# Patient Record
(demographics unavailable — no encounter records)

---

## 2024-12-11 NOTE — PHYSICAL EXAM
[Left] : left shoulder Notification Instructions: Patient will be notified of biopsy results. However, patient instructed to call the office if not contacted within 2 weeks. [5 ___] : forward flexion 5[unfilled]/5 [5___] : internal rotation 5[unfilled]/5 [] : positive [place] [[email or web address]] : active forward flexion 170 degrees [de-identified] : active abduction 160 degrees [de-identified] : external rotation at 90 degrees of abduction 90 degrees [[email or web address]] : internal rotation at 90 degrees of abduction 90 degrees

## 2024-12-11 NOTE — HISTORY OF PRESENT ILLNESS
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 Gradual onset of pain with kickboxing classes. Pain is anterior and associated with weakness. Worse with lifting objects, pushing motions, better with rest, Tylenol, NSAIDs.  H/O L shoulder partial supraspinatus tear, possible labrum tear 2014, conservative treatment with CSIs, prolotherapy, PRP injections (2-3) with Dr. Jeannie Andrade  PMH Graves disease, hypothyroidism, depression/anxiety/ADHD

## 2024-12-11 NOTE — DISCUSSION/SUMMARY
[de-identified] : 45f with left shoulder pain. Hx of labral tearing and partial tearing of the supraspinatus tendon in the past.  1) updated MRI left shoulder, eval labral  / rotator cuff tear 2) cryotherapy, rest and activity and exercise modification  3) note to freeze gym membership 4) rtc after MRI   Entered by Cindy White acting as scribe. Dr. Blair- The documentation recorded by the scribe accurately reflects the service I personally performed and the decisions made by me.

## 2024-12-18 NOTE — DATA REVIEWED
[MRI] : MRI [Left] : left [Shoulder] : shoulder [Report was reviewed and noted in the chart] : The report was reviewed and noted in the chart [I independently reviewed and interpreted images and report] : I independently reviewed and interpreted images and report [I reviewed the films/CD] : I reviewed the films/CD [FreeTextEntry1] : 12/12/24: partial tear of the subscapularis. superior and anterior inferior labral tear. biceps tendinitis with subluxation. supraspinatus fraying/tendinitis. Mild ac joint djd with lateral acromial bone spurs

## 2024-12-18 NOTE — HISTORY OF PRESENT ILLNESS
[de-identified] : 12/18/24: Pt here to f/u L shoulder and review MRI taken 12/12/24. Reports no change in symptoms.  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 Gradual onset of pain with kickboxing classes. Pain is anterior and associated with weakness. Worse with lifting objects, pushing motions, better with rest, Tylenol, NSAIDs.  H/O L shoulder partial supraspinatus tear, possible labrum tear 2014, conservative treatment with CSIs, prolotherapy, PRP injections (2-3) with Dr. Jeannie Andrade  PMH Graves disease, hypothyroidism, depression/anxiety/ADHD

## 2024-12-18 NOTE — PHYSICAL EXAM
[Left] : left shoulder [5 ___] : forward flexion 5[unfilled]/5 [5___] : internal rotation 5[unfilled]/5 [] : motor and sensory intact distally [TWNoteComboBox7] : active forward flexion 170 degrees [de-identified] : active abduction 160 degrees [de-identified] : external rotation at 90 degrees of abduction 90 degrees [TWNoteComboBox5] : internal rotation at 90 degrees of abduction 90 degrees

## 2024-12-18 NOTE — DISCUSSION/SUMMARY
[de-identified] : 45f with MRI of the left shoulder (12/12/24) with partial tear of the subscapularis. superior and anterior inferior labral tear. biceps tendinitis with subluxation. supraspinatus fraying/tendinitis. Mild ac joint djd with lateral acromial bone spurs.   The patient was advised of the diagnosis. The natural history of the pathology was explained in full to the patient in layman's terms. All questions were answered. The risks and benefits of surgical and non-surgical treatment alternatives were explained in full to the patient. pt given to opportunity to ask all questions and all questions were answered.  She has had intermittent left shoulder pain over the last 10 years. Hx of partial relief with csi and prp injections in the past. Discussed the availability of repeat injections and possible surgical intervention.   The risks, benefits, and alternatives to platelet rich plasma injection along with a discussion regarding its possible efficacy were reviewed with the patient.  Risks outlined include but are not limited to bruising, bleeding, temporary increase in pain, infection, syncopal episode, failure to resolve symptoms and symptoms recurrence.   Pt will be considering prp vs. surgical intervention.    Entered by Cindy White acting as scribe. Dr. Blair- The documentation recorded by the scribe accurately reflects the service I personally performed and the decisions made by me.